# Patient Record
Sex: FEMALE | Race: WHITE | NOT HISPANIC OR LATINO | URBAN - METROPOLITAN AREA
[De-identification: names, ages, dates, MRNs, and addresses within clinical notes are randomized per-mention and may not be internally consistent; named-entity substitution may affect disease eponyms.]

---

## 2024-09-26 ENCOUNTER — TELEPHONE (OUTPATIENT)
Dept: PAIN MEDICINE | Facility: CLINIC | Age: 49
End: 2024-09-26

## 2024-09-26 NOTE — TELEPHONE ENCOUNTER
----- Message from Beth Perez LPN sent at 9/25/2024  4:44 PM CDT -----  Contact: Robyn    ----- Message -----  From: Ruiz Dubon  Sent: 9/25/2024   4:20 PM CDT  To: [facility] Staff    Patient is calling in regards to schedule an appointment for pain relief in neck and back area. Call Back is [phone]

## 2024-09-26 NOTE — TELEPHONE ENCOUNTER
I spoke with Mrs Solis to let her know we would need a referral from her PCP, understanding verbalized.